# Patient Record
Sex: FEMALE | Race: WHITE | NOT HISPANIC OR LATINO | Employment: UNEMPLOYED | URBAN - NONMETROPOLITAN AREA
[De-identification: names, ages, dates, MRNs, and addresses within clinical notes are randomized per-mention and may not be internally consistent; named-entity substitution may affect disease eponyms.]

---

## 2023-12-04 ENCOUNTER — TRANSCRIBE ORDERS (OUTPATIENT)
Dept: ADMINISTRATIVE | Facility: HOSPITAL | Age: 88
End: 2023-12-04
Payer: MEDICARE

## 2023-12-04 DIAGNOSIS — M81.0 AGE-RELATED OSTEOPOROSIS WITHOUT CURRENT PATHOLOGICAL FRACTURE: Primary | ICD-10-CM

## 2023-12-04 DIAGNOSIS — Z12.31 VISIT FOR SCREENING MAMMOGRAM: ICD-10-CM

## 2023-12-27 ENCOUNTER — HOSPITAL ENCOUNTER (OUTPATIENT)
Dept: BONE DENSITY | Facility: HOSPITAL | Age: 88
Discharge: HOME | End: 2023-12-27
Admitting: INTERNAL MEDICINE
Payer: MEDICARE

## 2023-12-27 DIAGNOSIS — M81.0 AGE-RELATED OSTEOPOROSIS WITHOUT CURRENT PATHOLOGICAL FRACTURE: ICD-10-CM

## 2023-12-27 PROCEDURE — 77080 DXA BONE DENSITY AXIAL: CPT

## 2024-01-15 ENCOUNTER — TRANSCRIBE ORDERS (OUTPATIENT)
Dept: ADMINISTRATIVE | Facility: HOSPITAL | Age: 89
End: 2024-01-15
Payer: MEDICARE

## 2024-01-15 DIAGNOSIS — D72.820 PERSISTENT LYMPHOCYTOSIS: Primary | ICD-10-CM

## 2024-01-19 ENCOUNTER — TRANSCRIBE ORDERS (OUTPATIENT)
Dept: ADMINISTRATIVE | Facility: HOSPITAL | Age: 89
End: 2024-01-19
Payer: MEDICARE

## 2024-01-19 DIAGNOSIS — D72.820 PERSISTENT LYMPHOCYTOSIS: Primary | ICD-10-CM

## 2024-01-25 ENCOUNTER — HOSPITAL ENCOUNTER (OUTPATIENT)
Dept: ULTRASOUND IMAGING | Facility: HOSPITAL | Age: 89
Discharge: HOME | End: 2024-01-25
Payer: MEDICARE

## 2024-01-25 ENCOUNTER — HOSPITAL ENCOUNTER (OUTPATIENT)
Dept: ULTRASOUND IMAGING | Facility: HOSPITAL | Age: 89
Discharge: HOME | End: 2024-01-25
Admitting: INTERNAL MEDICINE
Payer: MEDICARE

## 2024-01-25 DIAGNOSIS — D72.820 PERSISTENT LYMPHOCYTOSIS: ICD-10-CM

## 2024-01-25 PROCEDURE — 76856 US EXAM PELVIC COMPLETE: CPT

## 2024-01-25 PROCEDURE — 76700 US EXAM ABDOM COMPLETE: CPT

## 2024-01-25 PROCEDURE — 76856 US EXAM PELVIC COMPLETE: CPT | Performed by: RADIOLOGY

## 2024-01-25 PROCEDURE — 76700 US EXAM ABDOM COMPLETE: CPT | Performed by: RADIOLOGY

## 2024-02-07 ENCOUNTER — LAB (OUTPATIENT)
Dept: ONCOLOGY | Facility: CLINIC | Age: 89
End: 2024-02-07
Payer: MEDICARE

## 2024-02-07 ENCOUNTER — CONSULT (OUTPATIENT)
Dept: ONCOLOGY | Facility: CLINIC | Age: 89
End: 2024-02-07
Payer: MEDICARE

## 2024-02-07 VITALS
DIASTOLIC BLOOD PRESSURE: 74 MMHG | TEMPERATURE: 97.5 F | HEIGHT: 65 IN | RESPIRATION RATE: 18 BRPM | SYSTOLIC BLOOD PRESSURE: 151 MMHG | HEART RATE: 65 BPM | OXYGEN SATURATION: 98 % | BODY MASS INDEX: 24.29 KG/M2 | WEIGHT: 145.8 LBS

## 2024-02-07 DIAGNOSIS — D72.820 LYMPHOCYTOSIS: ICD-10-CM

## 2024-02-07 DIAGNOSIS — D72.820 LYMPHOCYTOSIS: Primary | ICD-10-CM

## 2024-02-07 LAB
ALBUMIN SERPL-MCNC: 4.3 G/DL (ref 3.5–5.2)
ALBUMIN/GLOB SERPL: 1.4 G/DL
ALP SERPL-CCNC: 87 U/L (ref 39–117)
ALT SERPL W P-5'-P-CCNC: 14 U/L (ref 1–33)
ANION GAP SERPL CALCULATED.3IONS-SCNC: 9.8 MMOL/L (ref 5–15)
AST SERPL-CCNC: 17 U/L (ref 1–32)
BASOPHILS # BLD AUTO: 0.05 10*3/MM3 (ref 0–0.2)
BASOPHILS NFR BLD AUTO: 0.6 % (ref 0–1.5)
BILIRUB SERPL-MCNC: 0.4 MG/DL (ref 0–1.2)
BUN SERPL-MCNC: 16 MG/DL (ref 8–23)
BUN/CREAT SERPL: 20.5 (ref 7–25)
CALCIUM SPEC-SCNC: 9.6 MG/DL (ref 8.6–10.5)
CHLORIDE SERPL-SCNC: 99 MMOL/L (ref 98–107)
CHROMATIN AB SERPL-ACNC: <10 IU/ML (ref 0–14)
CO2 SERPL-SCNC: 27.2 MMOL/L (ref 22–29)
CREAT SERPL-MCNC: 0.78 MG/DL (ref 0.57–1)
CRP SERPL-MCNC: <0.3 MG/DL (ref 0–0.5)
DEPRECATED RDW RBC AUTO: 43.7 FL (ref 37–54)
EGFRCR SERPLBLD CKD-EPI 2021: 74.1 ML/MIN/1.73
EOSINOPHIL # BLD AUTO: 0.31 10*3/MM3 (ref 0–0.4)
EOSINOPHIL NFR BLD AUTO: 3.8 % (ref 0.3–6.2)
ERYTHROCYTE [DISTWIDTH] IN BLOOD BY AUTOMATED COUNT: 12.7 % (ref 12.3–15.4)
ERYTHROCYTE [SEDIMENTATION RATE] IN BLOOD: 18 MM/HR (ref 0–30)
GLOBULIN UR ELPH-MCNC: 3.1 GM/DL
GLUCOSE SERPL-MCNC: 106 MG/DL (ref 65–99)
HCT VFR BLD AUTO: 37.3 % (ref 34–46.6)
HGB BLD-MCNC: 12.1 G/DL (ref 12–15.9)
HIV 1+2 AB+HIV1 P24 AG SERPL QL IA: NORMAL
IMM GRANULOCYTES # BLD AUTO: 0.02 10*3/MM3 (ref 0–0.05)
IMM GRANULOCYTES NFR BLD AUTO: 0.2 % (ref 0–0.5)
LDH SERPL-CCNC: 172 U/L (ref 135–214)
LYMPHOCYTES # BLD AUTO: 3.68 10*3/MM3 (ref 0.7–3.1)
LYMPHOCYTES NFR BLD AUTO: 45.2 % (ref 19.6–45.3)
MCH RBC QN AUTO: 30.3 PG (ref 26.6–33)
MCHC RBC AUTO-ENTMCNC: 32.4 G/DL (ref 31.5–35.7)
MCV RBC AUTO: 93.3 FL (ref 79–97)
MONOCYTES # BLD AUTO: 0.62 10*3/MM3 (ref 0.1–0.9)
MONOCYTES NFR BLD AUTO: 7.6 % (ref 5–12)
NEUTROPHILS NFR BLD AUTO: 3.46 10*3/MM3 (ref 1.7–7)
NEUTROPHILS NFR BLD AUTO: 42.6 % (ref 42.7–76)
NRBC BLD AUTO-RTO: 0 /100 WBC (ref 0–0.2)
PLATELET # BLD AUTO: 297 10*3/MM3 (ref 140–450)
PMV BLD AUTO: 9.3 FL (ref 6–12)
POTASSIUM SERPL-SCNC: 4.5 MMOL/L (ref 3.5–5.2)
PROT SERPL-MCNC: 7.4 G/DL (ref 6–8.5)
RBC # BLD AUTO: 4 10*6/MM3 (ref 3.77–5.28)
SODIUM SERPL-SCNC: 136 MMOL/L (ref 136–145)
URATE SERPL-MCNC: 5.2 MG/DL (ref 2.4–5.7)
WBC NRBC COR # BLD AUTO: 8.14 10*3/MM3 (ref 3.4–10.8)

## 2024-02-07 PROCEDURE — 80053 COMPREHEN METABOLIC PANEL: CPT | Performed by: NURSE PRACTITIONER

## 2024-02-07 PROCEDURE — 84550 ASSAY OF BLOOD/URIC ACID: CPT | Performed by: NURSE PRACTITIONER

## 2024-02-07 PROCEDURE — 85025 COMPLETE CBC W/AUTO DIFF WBC: CPT | Performed by: NURSE PRACTITIONER

## 2024-02-07 PROCEDURE — 88184 FLOWCYTOMETRY/ TC 1 MARKER: CPT

## 2024-02-07 PROCEDURE — 86140 C-REACTIVE PROTEIN: CPT | Performed by: NURSE PRACTITIONER

## 2024-02-07 PROCEDURE — 86431 RHEUMATOID FACTOR QUANT: CPT | Performed by: NURSE PRACTITIONER

## 2024-02-07 PROCEDURE — 85652 RBC SED RATE AUTOMATED: CPT | Performed by: NURSE PRACTITIONER

## 2024-02-07 PROCEDURE — G0432 EIA HIV-1/HIV-2 SCREEN: HCPCS | Performed by: NURSE PRACTITIONER

## 2024-02-07 PROCEDURE — 83615 LACTATE (LD) (LDH) ENZYME: CPT | Performed by: NURSE PRACTITIONER

## 2024-02-07 PROCEDURE — 86038 ANTINUCLEAR ANTIBODIES: CPT | Performed by: NURSE PRACTITIONER

## 2024-02-07 PROCEDURE — 88185 FLOWCYTOMETRY/TC ADD-ON: CPT

## 2024-02-07 RX ORDER — MELATONIN
1000 DAILY
COMMUNITY

## 2024-02-07 RX ORDER — CHLORAL HYDRATE 500 MG
1000 CAPSULE ORAL
COMMUNITY

## 2024-02-07 RX ORDER — NIACIN 1000 MG/1
1000 TABLET, EXTENDED RELEASE ORAL NIGHTLY
COMMUNITY

## 2024-02-07 RX ORDER — CLONIDINE HYDROCHLORIDE 0.1 MG/1
0.1 TABLET ORAL 2 TIMES DAILY
COMMUNITY

## 2024-02-07 RX ORDER — LISINOPRIL 10 MG/1
10 TABLET ORAL DAILY
COMMUNITY

## 2024-02-07 NOTE — PROGRESS NOTES
"DATE OF CONSULTATION:  2/7/2024    REASON FOR REFERRAL: Lymphocytosis    REFERRING PHYSICIAN:  Dolly Cherry MD    CHIEF COMPLAINT:  None      HISTORY OF PRESENT ILLNESS:   Deja Valenzuela is a very pleasant 88 y.o. female who is being seen today at the request of Dolly Cherry MD for evaluation and treatment of lymphocytosis. Patient reports that she recently established care with Dr. Cherry after moving to the area in July 2023. She states that she has had 2 office visits with her and around 1 month ago was made aware of \"an issue with my blood\". She denies prior history of elevated WBC, lymphocyte count or other abnormalities with lab testing. She states that she feels well overall and continues to work out at the local gym around 4 days per week. She denies recent/recurrent infections, steroid use, or underlying inflammation. She denies fever, chills, fatigue, drenching night sweats or tender/enlarged lymph nodes. She denies unintentional weight loss. She reports that her brother passed away of lymphoma secondary to agent orange exposure when he was in the  but denies other family history of myeloproliferative disorder. She is well and is without specific complaints.       PAST MEDICAL HISTORY:  Past Medical History:   Diagnosis Date    Anemia     History of blood transfusion     Hypertension     Kidney infection     Osteoporosis        PAST SURGICAL HISTORY:  Past Surgical History:   Procedure Laterality Date    APPENDECTOMY      HERNIA REPAIR      OVARY SURGERY         FAMILY HISTORY:  Family History   Problem Relation Age of Onset    Heart failure Mother     Heart disease Father     Pancreatic cancer Sister     Cancer Brother     Heart attack Brother        SOCIAL HISTORY:  Social History     Socioeconomic History    Marital status:    Tobacco Use    Smoking status: Never    Smokeless tobacco: Never   Vaping Use    Vaping Use: Never used   Substance and Sexual Activity    Alcohol use: Yes    " " Comment: social    Drug use: Never    Sexual activity: Defer        MEDICATIONS:  The current medication list was reviewed in the EMR    Current Outpatient Medications:     Bilberry, Vaccinium myrtillus, (BILBERRY PO), Take  by mouth., Disp: , Rfl:     Cholecalciferol 25 MCG (1000 UT) tablet, Take 1 tablet by mouth Daily., Disp: , Rfl:     cloNIDine (CATAPRES) 0.1 MG tablet, Take 1 tablet by mouth 2 (Two) Times a Day., Disp: , Rfl:     FENUGREEK PO, Take  by mouth., Disp: , Rfl:     [place] 565 MG capsule, Take  by mouth., Disp: , Rfl:     lisinopril (PRINIVIL,ZESTRIL) 10 MG tablet, Take 1 tablet by mouth Daily., Disp: , Rfl:     LUTEIN-BILBERRY PO, Take  by mouth., Disp: , Rfl:     niacin (NIASPAN) 1000 MG CR tablet, Take 1 tablet by mouth Every Night., Disp: , Rfl:     Omega-3 Fatty Acids (fish oil) 1000 MG capsule capsule, Take 1 capsule by mouth Daily With Breakfast., Disp: , Rfl:     Omega-3 Fatty Acids (SALMON OIL PO), Take  by mouth., Disp: , Rfl:     ALLERGIES:    Allergies   Allergen Reactions    Peanut-Containing Drug Products Anaphylaxis    Gluten Meal Nausea And Vomiting    Lavender Oil Rash    [place] Rash         REVIEW OF SYSTEMS:    A comprehensive 14 point review of systems was performed.  Significant findings as mentioned above.  All other systems reviewed and are negative.        Physical Exam   Vital Signs: /74   Pulse 65   Temp 97.5 °F (36.4 °C) (Temporal)   Resp 18   Ht 165.1 cm (65\")   Wt 66.1 kg (145 lb 12.8 oz)   SpO2 98%   BMI 24.26 kg/m²      ECOG score: 0   General/Constitutional: Awake, alert and oriented. No apparent acute distress is noted.  HEENT: Normocephalic, atraumatic. PERRLA, conjunctiva normal. Extraocular movements are intact.  Neck: No JVD, thyromegaly or cervical lymphadenopathy.  Cardiovascular: S1, S2. Regular rate and rhythm, no murmurs, rubs or gallops.  Respiratory: Pulmonary effort is normal, lungs are clear to auscultation bilaterally. No wheezing, " rhonchi or rales. No use of accessory muscles, no retractions.  Abdomen: Soft, non-tender, non-distended with normoactive bowel sounds x 4 quadrants. No palpable hepatosplenomegaly.  Lymph: Small left anterior cervical lymph node, otherwise no cervical, supraclavicular, axillary, inguinal or femoral adenopathy.  Integumentary: Skin warm and dry. No bruising, ecchymosis.  Extremities: No clubbing, cyanosis or edema.  Neurological: Alert and oriented x 3. Grossly non-focal examination.    Pain Score:  Pain Score    02/07/24 1050   PainSc: 0-No pain       PHQ-Score Total:  PHQ-9 Total Score: 0       PATHOLOGY:        ENDOSCOPY:        IMAGING:  US Abdomen Complete (01/25/2024 09:38)   FINDINGS:    Liver:  Unremarkable as visualized.  No mass.  No intrahepatic bile  duct dilation.    Gallbladder:  Unremarkable as visualized.  No gallstones.    Common bile duct:  Unremarkable as visualized.  No stones.  No  dilation.  Common bile duct measures 0.6 cm in diameter.    Pancreas:  Unremarkable as visualized.    Kidneys:  Unremarkable as visualized.  No stones.  No hydronephrosis.   The right kidney measures 9.8 cm in length.  The left kidney measures  8.5 cm in length.    Spleen:  Unremarkable as visualized.  The spleen measures 6 cm in  maximum dimension.    Aorta:  Infrarenal abdominal aortic ectasia up to 3 cm.  No aneurysm.    Inferior vena cava:  Unremarkable as visualized.     IMPRESSION:    Infrarenal abdominal aortic ectasia up to 3 cm.    US Pelvis Complete (01/25/2024 09:38)   FINDINGS:    No uterus or ovaries visualized.     IMPRESSION:    No uterus or ovaries visualized.    LABS:  11/17/2023 12/18/2023      RECENT LABS:  Lab Results   Component Value Date    WBC 8.14 02/07/2024    HGB 12.1 02/07/2024    HCT 37.3 02/07/2024    MCV 93.3 02/07/2024    RDW 12.7 02/07/2024     02/07/2024    NEUTRORELPCT 42.6 (L) 02/07/2024    LYMPHORELPCT 45.2 02/07/2024    MONORELPCT 7.6 02/07/2024    EOSRELPCT 3.8  02/07/2024    BASORELPCT 0.6 02/07/2024    NEUTROABS 3.46 02/07/2024    LYMPHSABS 3.68 (H) 02/07/2024     Lab Results   Component Value Date     02/07/2024    K 4.5 02/07/2024    CO2 27.2 02/07/2024    CL 99 02/07/2024    BUN 16 02/07/2024    CREATININE 0.78 02/07/2024    GLUCOSE 106 (H) 02/07/2024    CALCIUM 9.6 02/07/2024    ALKPHOS 87 02/07/2024    AST 17 02/07/2024    ALT 14 02/07/2024    BILITOT 0.4 02/07/2024    ALBUMIN 4.3 02/07/2024    PROTEINTOT 7.4 02/07/2024     Lab Results   Component Value Date/Time     02/07/2024 11:25 AM    URICACID 5.2 02/07/2024 11:25 AM         ASSESSMENT & PLAN:  Deja Valenzuela is a very pleasant 88 y.o. female with:    1. Lymphocytosis  - Previous available CBC's were reviewed for comparison of trend. Patient noted to have lymphocytosis with absolute lymphocyte count ranging from 3.18-3.63 since November of 2023.  WBC is within normal (6.14-7.09). Hg/Hct and platelet count are also unaffected and are within normal. There are no further lab results available to review as patient moved to the area in July 2023 and recently established care with Dr. Cherry.  - Per review of office note from 01/15/2024, there is mention of flow cytometry revealing CLL/SLL but this lab report is not available. Will try to obtain records.  - She denies recent or recurrent infections, steroid use, or underlying inflammation.  - She is well and is without concerning B-symptoms.  - CBC today with WBC 8.14, Hg 12.1, Hct 37.3 and platelet count 297,000. Absolute lymphocyte count noted to be 3.68 (3,680). Differential is otherwise unremarkable.  - Additional workup to further evaluate lymphocytosis including TAMIKA, rheumatoid factor, ESR, CRP, HIV, LDH, uric acid, peripheral blood smear, flow cytometry and BCR/ABL are currently pending. Also ordered for CT NCAP w/ contrast to be performed prior to follow up appointment.   - Differential diagnosis, including underlying myeloproliferative disorder, such  as leukemia or lymphoma were also discussed with the patient.   - Follow up in 3 weeks with repeat CBCD at that time. In the interim, she is aware that if she has any questions or concerns that she should notify our office and I would be happy to see her sooner.      ACO / CAROL/Other  Quality measures  -  Deja Valenzuela did not receive 2023 flu vaccine.  This was recommended.  -  Deja Valenzuela reports a pain score of 0.    -  Current outpatient and discharge medications have been reconciled for the patient.  Reviewed by: VICTOR MANUEL Rowe          The patient was in agreement with the plan and all questions were answered to her satisfaction.     Thank you so much for allowing us to participate in the care of Deja Valenzuela . Please do not hesitate to contact us with any questions or concerns.     A total of 45 minutes were spent coordinating this patient’s care in clinic today; more than 50% of this time was face-to-face with the patient, reviewing her interim medical history and counseling on the current treatment and followup plan. All questions were answered to her satisfaction.              Electronically Signed by: VICTOR MANUEL Thompson , February 7, 2024 16:10 EST       CC:   MD Dilip Duckworth Maria C., MD

## 2024-02-07 NOTE — PROGRESS NOTES
Venipuncture Blood Specimen Collection  Venipuncture performed in left arm by Marian Jackson MA with good hemostasis. Patient tolerated the procedure well without complications.   02/07/24   Marian Jackson MA

## 2024-02-08 LAB
ANA SER QL: NEGATIVE
REF LAB TEST METHOD: NORMAL
REF LAB TEST METHOD: NORMAL

## 2024-02-15 LAB
INTERPRETATION: NEGATIVE
LAB DIRECTOR NAME PROVIDER: NORMAL
LABORATORY COMMENT REPORT: NORMAL
REF LAB TEST METHOD: NORMAL
T(ABL1,BCR)B2A2/CONTROL BLD/T: NORMAL %
T(ABL1,BCR)B3A2/CONTROL BLD/T: NORMAL %
T(ABL1,BCR)E1A2/CONTROL BLD/T: NORMAL %

## 2024-03-20 ENCOUNTER — OFFICE VISIT (OUTPATIENT)
Dept: ONCOLOGY | Facility: CLINIC | Age: 89
End: 2024-03-20
Payer: MEDICARE

## 2024-03-20 ENCOUNTER — LAB (OUTPATIENT)
Dept: ONCOLOGY | Facility: CLINIC | Age: 89
End: 2024-03-20
Payer: MEDICARE

## 2024-03-20 VITALS
BODY MASS INDEX: 23.49 KG/M2 | HEIGHT: 65 IN | DIASTOLIC BLOOD PRESSURE: 65 MMHG | TEMPERATURE: 97.5 F | WEIGHT: 141 LBS | RESPIRATION RATE: 20 BRPM | OXYGEN SATURATION: 98 % | SYSTOLIC BLOOD PRESSURE: 151 MMHG | HEART RATE: 63 BPM

## 2024-03-20 DIAGNOSIS — D72.820 LYMPHOCYTOSIS: ICD-10-CM

## 2024-03-20 DIAGNOSIS — D72.820 LYMPHOCYTOSIS: Primary | ICD-10-CM

## 2024-03-20 LAB
BASOPHILS # BLD AUTO: 0.05 10*3/MM3 (ref 0–0.2)
BASOPHILS NFR BLD AUTO: 0.7 % (ref 0–1.5)
DEPRECATED RDW RBC AUTO: 44 FL (ref 37–54)
EOSINOPHIL # BLD AUTO: 0.31 10*3/MM3 (ref 0–0.4)
EOSINOPHIL NFR BLD AUTO: 4.6 % (ref 0.3–6.2)
ERYTHROCYTE [DISTWIDTH] IN BLOOD BY AUTOMATED COUNT: 13 % (ref 12.3–15.4)
HCT VFR BLD AUTO: 35.6 % (ref 34–46.6)
HGB BLD-MCNC: 11.6 G/DL (ref 12–15.9)
IMM GRANULOCYTES # BLD AUTO: 0 10*3/MM3 (ref 0–0.05)
IMM GRANULOCYTES NFR BLD AUTO: 0 % (ref 0–0.5)
LYMPHOCYTES # BLD AUTO: 3.42 10*3/MM3 (ref 0.7–3.1)
LYMPHOCYTES NFR BLD AUTO: 50.6 % (ref 19.6–45.3)
MCH RBC QN AUTO: 30.4 PG (ref 26.6–33)
MCHC RBC AUTO-ENTMCNC: 32.6 G/DL (ref 31.5–35.7)
MCV RBC AUTO: 93.2 FL (ref 79–97)
MONOCYTES # BLD AUTO: 0.52 10*3/MM3 (ref 0.1–0.9)
MONOCYTES NFR BLD AUTO: 7.7 % (ref 5–12)
NEUTROPHILS NFR BLD AUTO: 2.46 10*3/MM3 (ref 1.7–7)
NEUTROPHILS NFR BLD AUTO: 36.4 % (ref 42.7–76)
NRBC BLD AUTO-RTO: 0 /100 WBC (ref 0–0.2)
PLATELET # BLD AUTO: 267 10*3/MM3 (ref 140–450)
PMV BLD AUTO: 9.9 FL (ref 6–12)
RBC # BLD AUTO: 3.82 10*6/MM3 (ref 3.77–5.28)
WBC NRBC COR # BLD AUTO: 6.76 10*3/MM3 (ref 3.4–10.8)

## 2024-03-20 PROCEDURE — 85025 COMPLETE CBC W/AUTO DIFF WBC: CPT | Performed by: NURSE PRACTITIONER

## 2024-03-20 NOTE — PROGRESS NOTES
Venipuncture Blood Specimen Collection  Venipuncture performed in left arm by Roula Geronimo MA with good hemostasis. Patient tolerated the procedure well without complications.   03/20/24   Roula Geronimo MA

## 2024-03-20 NOTE — PROGRESS NOTES
"DATE:  3/20/2024    DIAGNOSIS: Lymphocytosis    CHIEF COMPLAINT:  None      HISTORY OF PRESENT ILLNESS:   Deja Valenzuela is a very pleasant 88 y.o. female who is being seen today at the request of Dolly Cherry MD for evaluation and treatment of lymphocytosis. Patient reports that she recently established care with Dr. Cherry after moving to the area in July 2023. She states that she has had 2 office visits with her and around 1 month ago was made aware of \"an issue with my blood\". She denies prior history of elevated WBC, lymphocyte count or other abnormalities with lab testing. She states that she feels well overall and continues to work out at the local gym around 4 days per week. She denies recent/recurrent infections, steroid use, or underlying inflammation. She denies fever, chills, fatigue, drenching night sweats or tender/enlarged lymph nodes. She denies unintentional weight loss. She reports that her brother passed away of lymphoma secondary to agent orange exposure when he was in the  but denies other family history of myeloproliferative disorder. She is well and is without specific complaints.     Interval History:  Ms. Valenzuela presents today accompanied by her  for follow up of lymphocytosis. Since her last visit, she states that she has been doing well. She has remained active. She states that she continues to exercise routinely and has been focusing on increasing her water intake. She denies concerning B-symptoms or unintentional weight loss. She is otherwise well and without specific complaints.         The following portions of the patient's history were reviewed and updated as appropriate: allergies, current medications, past family history, past medical history, past social history, past surgical history and problem list.    PAST MEDICAL HISTORY:  Past Medical History:   Diagnosis Date    Anemia     History of blood transfusion     Hypertension     Kidney infection     Osteoporosis  "       PAST SURGICAL HISTORY:  Past Surgical History:   Procedure Laterality Date    APPENDECTOMY      HERNIA REPAIR      OVARY SURGERY         SOCIAL HISTORY:  Social History     Socioeconomic History    Marital status:    Tobacco Use    Smoking status: Never    Smokeless tobacco: Never   Vaping Use    Vaping status: Never Used   Substance and Sexual Activity    Alcohol use: Yes     Comment: social    Drug use: Never    Sexual activity: Defer       FAMILY HISTORY:  Family History   Problem Relation Age of Onset    Heart failure Mother     Heart disease Father     Pancreatic cancer Sister     Cancer Brother     Heart attack Brother        MEDICATIONS:  The current medication list was reviewed in the EMR    Current Outpatient Medications:     Bilberry, Vaccinium myrtillus, (BILBERRY PO), Take  by mouth., Disp: , Rfl:     Cholecalciferol 25 MCG (1000 UT) tablet, Take 1 tablet by mouth Daily., Disp: , Rfl:     cloNIDine (CATAPRES) 0.1 MG tablet, Take 1 tablet by mouth 2 (Two) Times a Day., Disp: , Rfl:     FENUGREEK PO, Take  by mouth., Disp: , Rfl:     [place] 565 MG capsule, Take  by mouth., Disp: , Rfl:     lisinopril (PRINIVIL,ZESTRIL) 10 MG tablet, Take 1 tablet by mouth Daily., Disp: , Rfl:     LUTEIN-BILBERRY PO, Take  by mouth., Disp: , Rfl:     niacin (NIASPAN) 1000 MG CR tablet, Take 1 tablet by mouth Every Night., Disp: , Rfl:     Omega-3 Fatty Acids (fish oil) 1000 MG capsule capsule, Take 1 capsule by mouth Daily With Breakfast., Disp: , Rfl:     Omega-3 Fatty Acids (SALMON OIL PO), Take  by mouth., Disp: , Rfl:     ALLERGIES:    Allergies   Allergen Reactions    Peanut-Containing Drug Products Anaphylaxis    Gluten Meal Nausea And Vomiting    Lavender Oil Rash    [place] Rash       REVIEW OF SYSTEMS:   A comprehensive 14 point review of systems was performed.  Significant findings as mentioned above.  All other systems reviewed and are negative.        Physical Exam  Vitals:    03/20/24 0947  "  BP: 151/65   Pulse: 63   Resp: 20   Temp: 97.5 °F (36.4 °C)   TempSrc: Temporal   SpO2: 98%   Weight: 64 kg (141 lb)   Height: 165.1 cm (65\")           ECOG score: 0       General: Well developed, well nourished, alert and oriented x 3, in no acute distress.   Head: ATNC   Eyes: PERRL, No evidence of conjunctivitis.   Neck: Neck supple. No thyromegaly. No JVD.   Lungs: Clear in all fields to A&P without rales, rhonchi or wheezing.   Heart: S1, S2. Regular rate and rhythm. No murmurs, rubs, or gallops.   Abdomen: Soft. Bowel sounds are normoactive. Nontender with palpation. No Hepatosplenomegaly can be appreciated.   Integumentary: No rash, sores, erythema or nodules. No blistering, bruising, or dry skin.   Lymph Nodes: No palpable cervical, submandibular, supraclavicular, axillary or inguinal lymphadenopathy noted. No petechiae, purpura or ecchymosis noted.   Neurologic: Alert, awake and oriented x 3. Motor strength 5/5 in all four extremities. Cranial nerves 2-12 grossly intact. No sensory deficit.    Pain Score:  Pain Score    03/20/24 0947   PainSc: 0-No pain       PHQ-Score Total:  PHQ-9 Total Score:         PATHOLOGY:        ENDOSCOPY:        IMAGING:  US Abdomen Complete (01/25/2024 09:38)   FINDINGS:    Liver:  Unremarkable as visualized.  No mass.  No intrahepatic bile  duct dilation.    Gallbladder:  Unremarkable as visualized.  No gallstones.    Common bile duct:  Unremarkable as visualized.  No stones.  No  dilation.  Common bile duct measures 0.6 cm in diameter.    Pancreas:  Unremarkable as visualized.    Kidneys:  Unremarkable as visualized.  No stones.  No hydronephrosis.   The right kidney measures 9.8 cm in length.  The left kidney measures  8.5 cm in length.    Spleen:  Unremarkable as visualized.  The spleen measures 6 cm in  maximum dimension.    Aorta:  Infrarenal abdominal aortic ectasia up to 3 cm.  No aneurysm.    Inferior vena cava:  Unremarkable as visualized.     IMPRESSION:    " Infrarenal abdominal aortic ectasia up to 3 cm.     US Pelvis Complete (01/25/2024 09:38)   FINDINGS:    No uterus or ovaries visualized.     IMPRESSION:    No uterus or ovaries visualized.      LABS:  11/17/2023 12/18/2023         Initial Workup - 02/07/2024  Component      Latest Ref Rng 2/7/2024   WBC      3.40 - 10.80 10*3/mm3 8.14    RBC      3.77 - 5.28 10*6/mm3 4.00    Hemoglobin      12.0 - 15.9 g/dL 12.1    Hematocrit      34.0 - 46.6 % 37.3    MCV      79.0 - 97.0 fL 93.3    MCH      26.6 - 33.0 pg 30.3    MCHC      31.5 - 35.7 g/dL 32.4    RDW      12.3 - 15.4 % 12.7    RDW-SD      37.0 - 54.0 fl 43.7    MPV      6.0 - 12.0 fL 9.3    Platelets      140 - 450 10*3/mm3 297    Neutrophil Rel %      42.7 - 76.0 % 42.6 (L)    Lymphocyte Rel %      19.6 - 45.3 % 45.2    Monocyte Rel %      5.0 - 12.0 % 7.6    Eosinophil Rel %      0.3 - 6.2 % 3.8    Basophil Rel %      0.0 - 1.5 % 0.6    Immature Granulocyte Rel %      0.0 - 0.5 % 0.2    Neutrophils Absolute      1.70 - 7.00 10*3/mm3 3.46    Lymphocytes Absolute      0.70 - 3.10 10*3/mm3 3.68 (H)    Monocytes Absolute      0.10 - 0.90 10*3/mm3 0.62    Eosinophils Absolute      0.00 - 0.40 10*3/mm3 0.31    Basophils Absolute      0.00 - 0.20 10*3/mm3 0.05    Immature Grans, Absolute      0.00 - 0.05 10*3/mm3 0.02    nRBC      0.0 - 0.2 /100 WBC 0.0    Glucose      65 - 99 mg/dL 106 (H)    BUN      8 - 23 mg/dL 16    Creatinine      0.57 - 1.00 mg/dL 0.78    Sodium      136 - 145 mmol/L 136    Potassium      3.5 - 5.2 mmol/L 4.5    Chloride      98 - 107 mmol/L 99    CO2      22.0 - 29.0 mmol/L 27.2    Calcium      8.6 - 10.5 mg/dL 9.6    Total Protein      6.0 - 8.5 g/dL 7.4    Albumin      3.5 - 5.2 g/dL 4.3    ALT (SGPT)      1 - 33 U/L 14    AST (SGOT)      1 - 32 U/L 17    Alkaline Phosphatase      39 - 117 U/L 87    Total Bilirubin      0.0 - 1.2 mg/dL 0.4    Globulin      gm/dL 3.1    A/G Ratio      g/dL 1.4    BUN/Creatinine Ratio      7.0 - 25.0   20.5    Anion Gap      5.0 - 15.0 mmol/L 9.8    eGFR      >60.0 mL/min/1.73 74.1    C-Reactive Protein      0.00 - 0.50 mg/dL <0.30    Sed Rate      0 - 30 mm/hr 18    Uric Acid      2.4 - 5.7 mg/dL 5.2    LDH      135 - 214 U/L 172    TAMIKA Direct      Negative  Negative    Rheumatoid Factor Quantitative      0.0 - 14.0 IU/mL <10.0    HIV-1/ HIV-2 Ab      Non-Reactive  Non-Reactive               BCR-ABL1, CML / ALL, PCR, Quant  Order: [id number]  Status: Final result       Visible to patient: No (not released)       Next appt: None       Dx: Lymphocytosis    Specimen Information: Arm, Left; Blood   0 Result Notes      Component  Ref Range & Units 1 mo ago   e13a2 (b2a2) Transcript  % Comment   Comment:            <0.0032 %   e14a2 (b3a2) Transcript  % Comment   Comment:            <0.0032 %   E1A2 transcript  % Comment   Comment:            <0.0032 %   Interpretation Negative   Comment: NEGATIVE for the BCR-ABL1 e1a2 (p190), e13a2 (b2a2, p210) and e14a2  (b3a2, p210) fusion transcripts. These results do not rule out the  presence of rare BCR-ABL1 transcripts not detected by this assay.        RECENT LABS:  Lab Results   Component Value Date    WBC 6.76 03/20/2024    HGB 11.6 (L) 03/20/2024    HCT 35.6 03/20/2024    MCV 93.2 03/20/2024    RDW 13.0 03/20/2024     03/20/2024    NEUTRORELPCT 36.4 (L) 03/20/2024    LYMPHORELPCT 50.6 (H) 03/20/2024    MONORELPCT 7.7 03/20/2024    EOSRELPCT 4.6 03/20/2024    BASORELPCT 0.7 03/20/2024    NEUTROABS 2.46 03/20/2024    LYMPHSABS 3.42 (H) 03/20/2024     Lab Results   Component Value Date     02/07/2024    K 4.5 02/07/2024    CO2 27.2 02/07/2024    CL 99 02/07/2024    BUN 16 02/07/2024    CREATININE 0.78 02/07/2024    GLUCOSE 106 (H) 02/07/2024    CALCIUM 9.6 02/07/2024    ALKPHOS 87 02/07/2024    AST 17 02/07/2024    ALT 14 02/07/2024    BILITOT 0.4 02/07/2024    ALBUMIN 4.3 02/07/2024    PROTEINTOT 7.4 02/07/2024       ASSESSMENT & PLAN:  Deja Valenzuela is a very  pleasant 88 y.o. female with    1. Lymphocytosis  - Previous available CBC's were reviewed for comparison of trend. Patient noted to have lymphocytosis with absolute lymphocyte count ranging from 3.18-3.63 since November of 2023.  WBC is within normal (6.14-7.09). Hg/Hct and platelet count are also unaffected and are within normal. There are no further lab results available to review as patient moved to the area in July 2023 and recently established care with Dr. Cherry.  - Per review of office note from 01/15/2024, there is mention of flow cytometry revealing CLL/SLL but this lab report is not available. I have not been able to obtain these lab reports at this time.   - She denies recent or recurrent infections, steroid use, or underlying inflammation.  - She is well and is without concerning B-symptoms.  - Additional workup to further evaluate lymphocytosis including TAMIKA, rheumatoid factor, ESR, CRP, HIV, LDH, uric acid, peripheral blood smear, flow cytometry and BCR/ABL were without cause for concern. Also ordered for CT NCAP w/ contrast to be performed prior to follow up appointment but she elected to not have these done.   - CBC today with WBC 6.76, Hg 11.6, Hct 35.6 and platelet count 267,000. There is mild lymphocytosis with ALC 3.48.  - Follow up in 4 months with repeat CBCD at that time. In the interim, she is aware that if she has any questions or concerns that she should notify our office and I would be happy to see her sooner.        ACO / CAROL/Other  Quality measures  -  Deja Valenzuela did not receive 2023 flu vaccine.  This was recommended but declined.  -  Deja Valenzuela reports a pain score of 0.    -  Current outpatient and discharge medications have been reconciled for the patient.  Reviewed by: VICTOR MANUEL Rowe          A total of 30 minutes were spent coordinating this patient’s care in clinic today; more than 50% of this time was face-to-face with the patient, reviewing her interim medical history  and counseling on the current treatment and followup plan. All questions were answered to her satisfaction.          Electronically Signed by: VICTOR MANUEL Thompson ,  March 20, 2024 10:29 EDT       CC:   No ref. provider found  Dolly Cherry MD

## 2024-03-25 ENCOUNTER — TRANSCRIBE ORDERS (OUTPATIENT)
Dept: ADMINISTRATIVE | Facility: HOSPITAL | Age: 89
End: 2024-03-25
Payer: MEDICARE

## 2024-03-25 DIAGNOSIS — G04.1: Primary | ICD-10-CM

## 2024-03-25 DIAGNOSIS — I65.23 BILATERAL CAROTID ARTERY OCCLUSION: ICD-10-CM

## 2024-07-24 ENCOUNTER — LAB (OUTPATIENT)
Dept: ONCOLOGY | Facility: CLINIC | Age: 89
End: 2024-07-24
Payer: MEDICARE

## 2024-07-24 ENCOUNTER — OFFICE VISIT (OUTPATIENT)
Dept: ONCOLOGY | Facility: CLINIC | Age: 89
End: 2024-07-24
Payer: MEDICARE

## 2024-07-24 VITALS
WEIGHT: 142.2 LBS | TEMPERATURE: 97.4 F | SYSTOLIC BLOOD PRESSURE: 118 MMHG | DIASTOLIC BLOOD PRESSURE: 63 MMHG | HEART RATE: 56 BPM | OXYGEN SATURATION: 96 % | RESPIRATION RATE: 18 BRPM | BODY MASS INDEX: 23.69 KG/M2 | HEIGHT: 65 IN

## 2024-07-24 DIAGNOSIS — D72.820 LYMPHOCYTOSIS: Primary | ICD-10-CM

## 2024-07-24 DIAGNOSIS — D72.820 LYMPHOCYTOSIS: ICD-10-CM

## 2024-07-24 LAB
BASOPHILS # BLD AUTO: 0.04 10*3/MM3 (ref 0–0.2)
BASOPHILS NFR BLD AUTO: 0.6 % (ref 0–1.5)
DEPRECATED RDW RBC AUTO: 43.4 FL (ref 37–54)
EOSINOPHIL # BLD AUTO: 0.21 10*3/MM3 (ref 0–0.4)
EOSINOPHIL NFR BLD AUTO: 3.2 % (ref 0.3–6.2)
ERYTHROCYTE [DISTWIDTH] IN BLOOD BY AUTOMATED COUNT: 12.6 % (ref 12.3–15.4)
HCT VFR BLD AUTO: 35.5 % (ref 34–46.6)
HGB BLD-MCNC: 11.6 G/DL (ref 12–15.9)
IMM GRANULOCYTES # BLD AUTO: 0.01 10*3/MM3 (ref 0–0.05)
IMM GRANULOCYTES NFR BLD AUTO: 0.2 % (ref 0–0.5)
LYMPHOCYTES # BLD AUTO: 3.11 10*3/MM3 (ref 0.7–3.1)
LYMPHOCYTES NFR BLD AUTO: 47.8 % (ref 19.6–45.3)
MCH RBC QN AUTO: 30.6 PG (ref 26.6–33)
MCHC RBC AUTO-ENTMCNC: 32.7 G/DL (ref 31.5–35.7)
MCV RBC AUTO: 93.7 FL (ref 79–97)
MONOCYTES # BLD AUTO: 0.46 10*3/MM3 (ref 0.1–0.9)
MONOCYTES NFR BLD AUTO: 7.1 % (ref 5–12)
NEUTROPHILS NFR BLD AUTO: 2.68 10*3/MM3 (ref 1.7–7)
NEUTROPHILS NFR BLD AUTO: 41.1 % (ref 42.7–76)
NRBC BLD AUTO-RTO: 0 /100 WBC (ref 0–0.2)
PLATELET # BLD AUTO: 274 10*3/MM3 (ref 140–450)
PMV BLD AUTO: 9.3 FL (ref 6–12)
RBC # BLD AUTO: 3.79 10*6/MM3 (ref 3.77–5.28)
WBC NRBC COR # BLD AUTO: 6.51 10*3/MM3 (ref 3.4–10.8)

## 2024-07-24 PROCEDURE — 1126F AMNT PAIN NOTED NONE PRSNT: CPT | Performed by: NURSE PRACTITIONER

## 2024-07-24 PROCEDURE — 99214 OFFICE O/P EST MOD 30 MIN: CPT | Performed by: NURSE PRACTITIONER

## 2024-07-24 PROCEDURE — 85025 COMPLETE CBC W/AUTO DIFF WBC: CPT | Performed by: NURSE PRACTITIONER

## 2024-07-24 PROCEDURE — 1159F MED LIST DOCD IN RCRD: CPT | Performed by: NURSE PRACTITIONER

## 2024-07-24 PROCEDURE — 1160F RVW MEDS BY RX/DR IN RCRD: CPT | Performed by: NURSE PRACTITIONER

## 2024-07-24 NOTE — PROGRESS NOTES
"DATE:  7/24/2024    DIAGNOSIS: Lymphocytosis    CHIEF COMPLAINT:  None      HISTORY OF PRESENT ILLNESS:   Deja Valenzuela is a very pleasant 88 y.o. female who is being seen today at the request of Dolly Cherry MD for evaluation and treatment of lymphocytosis. Patient reports that she recently established care with Dr. Cherry after moving to the area in July 2023. She states that she has had 2 office visits with her and around 1 month ago was made aware of \"an issue with my blood\". She denies prior history of elevated WBC, lymphocyte count or other abnormalities with lab testing. She states that she feels well overall and continues to work out at the local gym around 4 days per week. She denies recent/recurrent infections, steroid use, or underlying inflammation. She denies fever, chills, fatigue, drenching night sweats or tender/enlarged lymph nodes. She denies unintentional weight loss. She reports that her brother passed away of lymphoma secondary to agent orange exposure when he was in the  but denies other family history of myeloproliferative disorder. She is well and is without specific complaints.     Interval History:  Ms. Valenzuela presents today accompanied by her  for follow up of lymphocytosis. Since her last visit, she states that she has been doing well. Her and her  have been staying busy by working in their garden. She denies B-symptoms and is well and without specific complaints today.         The following portions of the patient's history were reviewed and updated as appropriate: allergies, current medications, past family history, past medical history, past social history, past surgical history and problem list.    PAST MEDICAL HISTORY:  Past Medical History:   Diagnosis Date    Anemia     History of blood transfusion     Hypertension     Kidney infection     Osteoporosis        PAST SURGICAL HISTORY:  Past Surgical History:   Procedure Laterality Date    APPENDECTOMY      " HERNIA REPAIR      OVARY SURGERY         SOCIAL HISTORY:  Social History     Socioeconomic History    Marital status:    Tobacco Use    Smoking status: Never    Smokeless tobacco: Never   Vaping Use    Vaping status: Never Used   Substance and Sexual Activity    Alcohol use: Yes     Comment: social    Drug use: Never    Sexual activity: Defer       FAMILY HISTORY:  Family History   Problem Relation Age of Onset    Heart failure Mother     Heart disease Father     Pancreatic cancer Sister     Cancer Brother     Heart attack Brother        MEDICATIONS:  The current medication list was reviewed in the EMR    Current Outpatient Medications:     Bilberry, Vaccinium myrtillus, (BILBERRY PO), Take  by mouth., Disp: , Rfl:     Cholecalciferol 25 MCG (1000 UT) tablet, Take 1 tablet by mouth Daily., Disp: , Rfl:     FENUGREEK PO, Take  by mouth., Disp: , Rfl:     [place] 565 MG capsule, Take  by mouth., Disp: , Rfl:     lisinopril (PRINIVIL,ZESTRIL) 10 MG tablet, Take 1 tablet by mouth Daily., Disp: , Rfl:     LUTEIN-BILBERRY PO, Take  by mouth., Disp: , Rfl:     niacin (NIASPAN) 1000 MG CR tablet, Take 1 tablet by mouth Every Night., Disp: , Rfl:     Omega-3 Fatty Acids (fish oil) 1000 MG capsule capsule, Take 1 capsule by mouth Daily With Breakfast., Disp: , Rfl:     Omega-3 Fatty Acids (SALMON OIL PO), Take  by mouth., Disp: , Rfl:     cloNIDine (CATAPRES) 0.1 MG tablet, Take 1 tablet by mouth 2 (Two) Times a Day. (Patient not taking: Reported on 7/24/2024), Disp: , Rfl:     ALLERGIES:    Allergies   Allergen Reactions    Peanut-Containing Drug Products Anaphylaxis    Gluten Meal Nausea And Vomiting    Lavender Oil Rash    [place] Rash       REVIEW OF SYSTEMS:   A comprehensive 14 point review of systems was performed.  Significant findings as mentioned above.  All other systems reviewed and are negative.        Physical Exam  Vitals:    07/24/24 1005   BP: 118/63   Pulse: 56   Resp: 18   Temp: 97.4 °F (36.3 °C)  "  TempSrc: Temporal   SpO2: 96%   Weight: 64.5 kg (142 lb 3.2 oz)   Height: 165.1 cm (65\")           ECOG score: 0       General: Well developed, well nourished, alert and oriented x 3, in no acute distress.   Head: ATNC   Eyes: PERRL, No evidence of conjunctivitis.   Neck: Neck supple. No thyromegaly. No JVD.   Lungs: Clear in all fields to A&P without rales, rhonchi or wheezing.   Heart: S1, S2. Regular rate and rhythm. No murmurs, rubs, or gallops.   Abdomen: Soft. Bowel sounds are normoactive. Nontender with palpation. No Hepatosplenomegaly can be appreciated.   Integumentary: No rash, sores, erythema or nodules. No blistering, bruising, or dry skin.   Lymph Nodes: No palpable cervical, submandibular, supraclavicular, axillary or inguinal lymphadenopathy noted. No petechiae, purpura or ecchymosis noted.   Neurologic: Alert, awake and oriented x 3. Motor strength 5/5 in all four extremities. Cranial nerves 2-12 grossly intact. No sensory deficit.    Pain Score:  Pain Score    07/24/24 1005   PainSc: 0-No pain       PHQ-Score Total:  PHQ-9 Total Score:         PATHOLOGY:        ENDOSCOPY:        IMAGING:  US Abdomen Complete (01/25/2024 09:38)   FINDINGS:    Liver:  Unremarkable as visualized.  No mass.  No intrahepatic bile  duct dilation.    Gallbladder:  Unremarkable as visualized.  No gallstones.    Common bile duct:  Unremarkable as visualized.  No stones.  No  dilation.  Common bile duct measures 0.6 cm in diameter.    Pancreas:  Unremarkable as visualized.    Kidneys:  Unremarkable as visualized.  No stones.  No hydronephrosis.   The right kidney measures 9.8 cm in length.  The left kidney measures  8.5 cm in length.    Spleen:  Unremarkable as visualized.  The spleen measures 6 cm in  maximum dimension.    Aorta:  Infrarenal abdominal aortic ectasia up to 3 cm.  No aneurysm.    Inferior vena cava:  Unremarkable as visualized.     IMPRESSION:    Infrarenal abdominal aortic ectasia up to 3 cm.     US Pelvis " Complete (01/25/2024 09:38)   FINDINGS:    No uterus or ovaries visualized.     IMPRESSION:    No uterus or ovaries visualized.      LABS:  11/17/2023 12/18/2023         Initial Workup - 02/07/2024  Component      Latest Ref Rng 2/7/2024   WBC      3.40 - 10.80 10*3/mm3 8.14    RBC      3.77 - 5.28 10*6/mm3 4.00    Hemoglobin      12.0 - 15.9 g/dL 12.1    Hematocrit      34.0 - 46.6 % 37.3    MCV      79.0 - 97.0 fL 93.3    MCH      26.6 - 33.0 pg 30.3    MCHC      31.5 - 35.7 g/dL 32.4    RDW      12.3 - 15.4 % 12.7    RDW-SD      37.0 - 54.0 fl 43.7    MPV      6.0 - 12.0 fL 9.3    Platelets      140 - 450 10*3/mm3 297    Neutrophil Rel %      42.7 - 76.0 % 42.6 (L)    Lymphocyte Rel %      19.6 - 45.3 % 45.2    Monocyte Rel %      5.0 - 12.0 % 7.6    Eosinophil Rel %      0.3 - 6.2 % 3.8    Basophil Rel %      0.0 - 1.5 % 0.6    Immature Granulocyte Rel %      0.0 - 0.5 % 0.2    Neutrophils Absolute      1.70 - 7.00 10*3/mm3 3.46    Lymphocytes Absolute      0.70 - 3.10 10*3/mm3 3.68 (H)    Monocytes Absolute      0.10 - 0.90 10*3/mm3 0.62    Eosinophils Absolute      0.00 - 0.40 10*3/mm3 0.31    Basophils Absolute      0.00 - 0.20 10*3/mm3 0.05    Immature Grans, Absolute      0.00 - 0.05 10*3/mm3 0.02    nRBC      0.0 - 0.2 /100 WBC 0.0    Glucose      65 - 99 mg/dL 106 (H)    BUN      8 - 23 mg/dL 16    Creatinine      0.57 - 1.00 mg/dL 0.78    Sodium      136 - 145 mmol/L 136    Potassium      3.5 - 5.2 mmol/L 4.5    Chloride      98 - 107 mmol/L 99    CO2      22.0 - 29.0 mmol/L 27.2    Calcium      8.6 - 10.5 mg/dL 9.6    Total Protein      6.0 - 8.5 g/dL 7.4    Albumin      3.5 - 5.2 g/dL 4.3    ALT (SGPT)      1 - 33 U/L 14    AST (SGOT)      1 - 32 U/L 17    Alkaline Phosphatase      39 - 117 U/L 87    Total Bilirubin      0.0 - 1.2 mg/dL 0.4    Globulin      gm/dL 3.1    A/G Ratio      g/dL 1.4    BUN/Creatinine Ratio      7.0 - 25.0  20.5    Anion Gap      5.0 - 15.0 mmol/L 9.8    eGFR      >60.0  mL/min/1.73 74.1    C-Reactive Protein      0.00 - 0.50 mg/dL <0.30    Sed Rate      0 - 30 mm/hr 18    Uric Acid      2.4 - 5.7 mg/dL 5.2    LDH      135 - 214 U/L 172    TAMIKA Direct      Negative  Negative    Rheumatoid Factor Quantitative      0.0 - 14.0 IU/mL <10.0    HIV-1/ HIV-2 Ab      Non-Reactive  Non-Reactive               BCR-ABL1, CML / ALL, PCR, Quant  Order: [id number]  Status: Final result       Visible to patient: No (not released)       Next appt: None       Dx: Lymphocytosis    Specimen Information: Arm, Left; Blood   0 Result Notes      Component  Ref Range & Units 1 mo ago   e13a2 (b2a2) Transcript  % Comment   Comment:            <0.0032 %   e14a2 (b3a2) Transcript  % Comment   Comment:            <0.0032 %   E1A2 transcript  % Comment   Comment:            <0.0032 %   Interpretation Negative   Comment: NEGATIVE for the BCR-ABL1 e1a2 (p190), e13a2 (b2a2, p210) and e14a2  (b3a2, p210) fusion transcripts. These results do not rule out the  presence of rare BCR-ABL1 transcripts not detected by this assay.        RECENT LABS:  Lab Results   Component Value Date    WBC 6.51 07/24/2024    HGB 11.6 (L) 07/24/2024    HCT 35.5 07/24/2024    MCV 93.7 07/24/2024    RDW 12.6 07/24/2024     07/24/2024    NEUTRORELPCT 41.1 (L) 07/24/2024    LYMPHORELPCT 47.8 (H) 07/24/2024    MONORELPCT 7.1 07/24/2024    EOSRELPCT 3.2 07/24/2024    BASORELPCT 0.6 07/24/2024    NEUTROABS 2.68 07/24/2024    LYMPHSABS 3.11 (H) 07/24/2024     Lab Results   Component Value Date     02/07/2024    K 4.5 02/07/2024    CO2 27.2 02/07/2024    CL 99 02/07/2024    BUN 16 02/07/2024    CREATININE 0.78 02/07/2024    GLUCOSE 106 (H) 02/07/2024    CALCIUM 9.6 02/07/2024    ALKPHOS 87 02/07/2024    AST 17 02/07/2024    ALT 14 02/07/2024    BILITOT 0.4 02/07/2024    ALBUMIN 4.3 02/07/2024    PROTEINTOT 7.4 02/07/2024       ASSESSMENT & PLAN:  Deja Valenzuela is a very pleasant 89 y.o. female with    1. Lymphocytosis  - Previous available  CBC's were reviewed for comparison of trend. Patient noted to have lymphocytosis with absolute lymphocyte count ranging from 3.18-3.63 since November of 2023.  WBC is within normal (6.14-7.09). Hg/Hct and platelet count are also unaffected and are within normal. There are no further lab results available to review as patient moved to the area in July 2023 and recently established care with Dr. Cherry.  - Per review of office note from 01/15/2024, there is mention of flow cytometry revealing CLL/SLL but this lab report is not available. I have not been able to obtain these lab reports at this time.   - She denies recent or recurrent infections, steroid use, or underlying inflammation.  - She is well and is without concerning B-symptoms.  - Additional workup to further evaluate lymphocytosis including TAMIKA, rheumatoid factor, ESR, CRP, HIV, LDH, uric acid, peripheral blood smear, flow cytometry and BCR/ABL were without cause for concern. Also ordered for CT NCAP w/ contrast to be performed prior to follow up appointment but she elected to not have these done.   - CBC today with WBC 6.51, Hg 11.6, Hct 35.5 and platelet count 274,000. There is mild lymphocytosis with ALC 3.11.  - Follow up in 4 months with repeat CBCD at that time. In the interim, she is aware that if she has any questions or concerns that she should notify our office and I would be happy to see her sooner.        ACO / CAROL/Other  Quality measures  -  Deja Valenzuela did not receive 2023 flu vaccine.  This was recommended but declined.  -  Deja Valenzuela reports a pain score of 0.    -  Current outpatient and discharge medications have been reconciled for the patient.  Reviewed by: VICTOR MANUEL Rowe          A total of 30 minutes were spent coordinating this patient’s care in clinic today; more than 50% of this time was face-to-face with the patient, reviewing her interim medical history and counseling on the current treatment and followup plan. All  questions were answered to her satisfaction.        Electronically Signed by: VICTOR MANUEL Thompson       CC:   No ref. provider found  Dolly Cherry MD

## 2024-07-24 NOTE — PROGRESS NOTES
Venipuncture Blood Specimen Collection  Venipuncture performed in left arm by Ruchi Yang MA with good hemostasis. Patient tolerated the procedure well without complications.   07/24/24   Ruchi Yang MA